# Patient Record
Sex: MALE | Race: AMERICAN INDIAN OR ALASKA NATIVE
[De-identification: names, ages, dates, MRNs, and addresses within clinical notes are randomized per-mention and may not be internally consistent; named-entity substitution may affect disease eponyms.]

---

## 2022-08-07 ENCOUNTER — HOSPITAL ENCOUNTER (EMERGENCY)
Dept: HOSPITAL 5 - ED | Age: 37
LOS: 1 days | Discharge: HOME | End: 2022-08-08
Payer: COMMERCIAL

## 2022-08-07 VITALS — DIASTOLIC BLOOD PRESSURE: 69 MMHG | SYSTOLIC BLOOD PRESSURE: 118 MMHG
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
## 2022-08-07 DIAGNOSIS — F17.200: ICD-10-CM
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
## 2022-08-07 DIAGNOSIS — S39.012A: Primary | ICD-10-CM

## 2022-08-07 DIAGNOSIS — M62.830: ICD-10-CM

## 2022-08-07 PROCEDURE — 99281 EMR DPT VST MAYX REQ PHY/QHP: CPT

## 2022-08-07 PROCEDURE — 96372 THER/PROPH/DIAG INJ SC/IM: CPT
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
